# Patient Record
Sex: MALE | Race: NATIVE HAWAIIAN OR OTHER PACIFIC ISLANDER | HISPANIC OR LATINO
[De-identification: names, ages, dates, MRNs, and addresses within clinical notes are randomized per-mention and may not be internally consistent; named-entity substitution may affect disease eponyms.]

---
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
## 2024-01-18 DIAGNOSIS — M47.816 SPONDYLOSIS W/OUT MYELOPATHY OR RADICULOPATHY, LUMBAR REGION: ICD-10-CM

## 2024-01-18 PROBLEM — Z00.00 ENCOUNTER FOR PREVENTIVE HEALTH EXAMINATION: Status: ACTIVE | Noted: 2024-01-18

## 2024-01-18 PROBLEM — Z78.9 OTHER SPECIFIED HEALTH STATUS: Chronic | Status: ACTIVE | Noted: 2021-11-27

## 2024-01-19 ENCOUNTER — APPOINTMENT (OUTPATIENT)
Dept: ORTHOPEDIC SURGERY | Facility: CLINIC | Age: 24
End: 2024-01-19
Payer: COMMERCIAL

## 2024-01-19 VITALS — SYSTOLIC BLOOD PRESSURE: 107 MMHG | HEART RATE: 84 BPM | DIASTOLIC BLOOD PRESSURE: 83 MMHG

## 2024-01-19 DIAGNOSIS — M51.36 OTHER INTERVERTEBRAL DISC DEGENERATION, LUMBAR REGION: ICD-10-CM

## 2024-01-19 PROCEDURE — 99204 OFFICE O/P NEW MOD 45 MIN: CPT

## 2024-01-19 PROCEDURE — 72100 X-RAY EXAM L-S SPINE 2/3 VWS: CPT

## 2024-01-19 NOTE — HISTORY OF PRESENT ILLNESS
[de-identified] : 23-year-old male who is having intermittent low back pain over the last 5 years.  Pain occurs when he is particularly active.  He states the pain last 1 or 2 days and prevents him from changing position.  When pain occurs it rates 9 out of 10 it is a stabbing sensation and it is located in the right greater than left side of his low back.  He has had no physical therapy chiropractic or pain management injections for this issue.  He is taking Tylenol intermittently with effect.  His last acute flareup was approximately 1 week ago its already resolved.  The pain never goes down his legs is does not cause change in bladder or or bowel.  Past medical surgical social family allergy history is reviewed.

## 2024-01-19 NOTE — PHYSICAL EXAM
[de-identified] : CONSTITUTIONAL: The patient is a very pleasant individual who is well-nourished and who appears stated age. PSYCHIATRIC: The patient is alert and oriented X 3 and in no apparent distress, and participates with orthopedic evaluation well. HEAD: Atraumatic and is nonsyndromic in appearance. EENT: No visible thyromegaly, EOMI. RESPIRATORY: Respiratory rate is regular, not dyspneic on examination. LYMPHATICS: There is no inguinal lymphadenopathy INTEGUMENTARY: Skin is clean, dry, and intact about the bilateral lower extremities and lumbar spine. VASCULAR: There is brisk capillary refill about the bilateral lower extremities. NEUROLOGIC: There are no pathologic reflexes. There is no decrease in sensation of the bilateral lower extremities on manual  examination. Deep tendon reflexes are well maintained at 2+/4 of the bilateral lower extremities and are symmetric.. MUSCULOSKELETAL: There is no visible muscular atrophy. Manual motor strength is well maintained in the bilateral lower extremities. Range of motion of lumbar spine is well maintained. The patient ambulates in a non-myelopathic manner. Negative tension sign and straight leg raise bilaterally. Quad extension, ankle dorsiflexion, EHL, plantar flexion, and ankle eversion are well preserved. Normal secondary orthopaedic exam of bilateral hips, greater trochanteric area, knees and ankles mild mechanical low back pain on flexion [de-identified] : Lumbar x-rays have been reviewed on today's date 2 views were ordered and reviewed shows essentially normal lumbar MRI pedicles maintain lordosis is maintained no acute osseous abnormalities

## 2024-01-19 NOTE — DISCUSSION/SUMMARY
[Medication Risks Reviewed] : Medication risks reviewed [PRN] : PRN [de-identified] : 45 minutes was spent reviewing the x-rays as well as discussing with the patient their clinical presentation, diagnosis and providing education.  Conservative treatment was discussed with the patient at length. Anticipatory guidance regarding disease process lumbar discogenic pain, avoidance of acute exacerbation this was discussed at length and all patients commenting concerns were answered to the patient's satisfaction. Physical therapy for decrease pain and increase function was ordered. Patient was given home exercises as approved by [employer] and works well held directed toward this particular process. Intermittent use of acetaminophen 500 mg 2 tablets t.i.d. p.r.n. mild to moderate pain, ibuprofen 600 mg t.i.d. p.r.n. severe pain with food or milk if there is no medical contraindication. Home exercise including stretching on a daily basis for 20-30 minutes was recommended. Heat, ice, topical were discussed as needed. The patient will followup in 6-8 weeks at which point in time if symptoms continue we will order MRI studies to guide treatment plan including possible injection therapy with pain management versus surgical option.

## 2024-10-15 ENCOUNTER — EMERGENCY (EMERGENCY)
Facility: HOSPITAL | Age: 24
LOS: 0 days | Discharge: HOME | End: 2024-10-15
Attending: EMERGENCY MEDICINE
Payer: COMMERCIAL

## 2024-10-15 VITALS
TEMPERATURE: 99 F | SYSTOLIC BLOOD PRESSURE: 112 MMHG | WEIGHT: 169.98 LBS | RESPIRATION RATE: 18 BRPM | HEART RATE: 60 BPM | DIASTOLIC BLOOD PRESSURE: 73 MMHG | OXYGEN SATURATION: 98 %

## 2024-10-15 DIAGNOSIS — B02.9 ZOSTER WITHOUT COMPLICATIONS: ICD-10-CM

## 2024-10-15 DIAGNOSIS — R21 RASH AND OTHER NONSPECIFIC SKIN ERUPTION: ICD-10-CM

## 2024-10-15 PROCEDURE — 99284 EMERGENCY DEPT VISIT MOD MDM: CPT

## 2024-10-15 RX ORDER — VALACYCLOVIR 1000 MG/1
1 TABLET ORAL
Qty: 21 | Refills: 0
Start: 2024-10-15 | End: 2024-10-21

## 2024-10-15 RX ORDER — OXYCODONE HYDROCHLORIDE 30 MG/1
1 TABLET, FILM COATED, EXTENDED RELEASE ORAL
Qty: 6 | Refills: 0
Start: 2024-10-15 | End: 2024-10-17

## 2024-10-15 NOTE — ED STATDOCS - PROGRESS NOTE DETAILS
Using , 23-year-old male came from [place] approximately 2 years ago presents with left-sided back and chest rash approximately 1 week.  Patient noticed a rash developing in the back and spreading towards the front.  Associated pain and itchiness with the rash.  Patient tried to use hydrocortisone cream without relief.  Patient states that when he came here 2 years ago he received all his immunizations but is unsure if he had chickenpox when he was younger.  Dermatomal blister like rash to the left upper back and left upper chest, does not cross midline.  No surrounding erythema or signs of infection.  Vitals from intake are unremarkable and patient afebrile.  Likely related to shingles.  Advised to take Motrin/Tylenol for pain, will prescribed Valtrex, and will discharge home.  Informed patient that he should avoid physical contact with the other children who are in the household and to stay away from anybody who may be pregnant.  Patient is aware and agrees with plan.

## 2024-10-15 NOTE — ED STATDOCS - OBJECTIVE STATEMENT
24 y/o male presents to the ED c/o rash left upper chest x days. Denies fevers, n/v/d. No other complaints at this time.

## 2024-10-15 NOTE — ED STATDOCS - CLINICAL SUMMARY MEDICAL DECISION MAKING FREE TEXT BOX
Pt presents with chicken pox appearing rash. Pt has never had chicken pox. Plan: antivirals,  follow up.

## 2024-10-15 NOTE — ED STATDOCS - NSFOLLOWUPINSTRUCTIONS_ED_ALL_ED_FT
Culebrilla  Shingles  A rash with blisters on the skin.  La culebrilla, o herpes zóster, es tulio infección. Produce tulio erupción cutánea y ampollas. Estas zonas infectadas pueden doler mucho.    La culebrilla solo ocurre si:  Ha tenido varicela.  Le gonzalez aplicado tulio inyección llamada vacuna para protegerlo de la varicela. La culebrilla es poco frecuente en aislinn isaac.  ¿Cuáles son las causas?  La culebrilla es causada por un germen llamado virus de la varicela zóster. Aisilnn es el mismo germen que causa la varicela. Después de estar expuesto al germen, aislinn permanece en el cuerpo, marya está latente. Mableton significa que no está activo.    La culebrilla se produce si el germen se vuelve activo nuevamente. Mableton puede ocurrir años después de la primera exposición al germen.    ¿Qué incrementa el riesgo?  Es más probable que tenga culebrilla si:  Es mayor de 60 años.  Está bajo mucho estrés.  Tiene un sistema inmunitario débil. El sistema inmunitario es el sistema de defensa de campos cuerpo. Puede estar débil si la persona:  Tiene el virus de inmunodeficiencia humana (VIH).  Tiene síndrome de inmunodeficiencia adquirida (sida).  Tiene cáncer.  Tatyana medicamentos que debilitan el sistema inmunitario. Estos incluyen los medicamentos para el trasplante de órganos.  ¿Cuáles son los signos o síntomas?  Los primeros síntomas de la culebrilla pueden ser picazón, hormigueo o dolor. Es posible que sienta que le arde la piel.    Unos días o semanas más tarde, tendrá tulio erupción cutánea. Mableton es lo que puede esperar:  Es probable que la erupción aparezca en un solo lado del cuerpo.  La erupción puede tener forma de cinturón o cinta. Con el tiempo, se convertirá en ampollas llenas de líquido.  Las ampollas se romperán y se transformarán en costras.  Las costras se secarán en unas 2 o 3 semanas.  También puede tener:  Fiebre.  Escalofríos.  Dolor de isidro.  Náuseas.  ¿Cómo se diagnostica?  La culebrilla se diagnostica con un examen de la piel. Se puede melchor tulio muestra llamada cultivo de tulio de las ampollas y enviarla a un laboratorio. [place]ableton indicará si tiene culebrilla.    ¿Cómo se trata?  La erupción cutánea puede durar varias semanas. No hay gaby para la culebrilla, marya el médico puede darle medicamentos. Estos medicamentos pueden hacer lo siguiente:  Ayudar a aliviar el dolor.  Ayudar a la picazón.  Ayudar con la irritación y la hinchazón.  Lograr tulio mejoría más pronto.  Prevenir problemas a kaz plazo.  Si la erupción está en la faheem, es posible que deba consultar a un oculista o a un médico especialista en nariz, garganta y oído (otorrinolaringólogo).    Siga estas instrucciones en campos casa:  Medicamentos    Use los medicamentos solamente lillian se lo haya indicado el médico.  Use tulio crema para calmar la picazón o cremas anestésicas en la erupción cutánea o las ampollas según las indicaciones del médico.  Para aliviar la picazón y las molestias    A bathtub filled with water.  Para ayudar a la picazón:  Coloque paños fríos y húmedos, llamados compresas frías, sobre la erupción cutánea o las ampollas.  [place] un baño frío. Pruebe agregar bicarbonato de sodio o marlon seca en el agua. No se bañe con [place].  Use tulio loción de calamina en la erupción cutánea o las ampollas. Puede conseguir aislinn tipo de loción en la idris.  Cuidado de las ampollas y la erupción cutánea    Mantenga la jack de la erupción cutánea cubierta con tulio venda floja.  Use ropa holgada que no roce contra la erupción.  Cuídese la erupción cutánea lillian se lo haya indicado el médico. Asegúrese de hacer lo siguiente:  Lávese las jonathan con agua y jabón rodrigo al menos 20 segundos antes y después de cambiarse la venda. Si no dispone de agua y jabón, use un desinfectante para jonathan.  Mantenga la erupción cutánea y las ampollas limpias lavando la jack con jabón suave y agua fría.  Cámbiese la venda.  Verifique la erupción cutánea todos los días para detectar signos de infección. Esté atento a los siguientes signos:  Aumento del enrojecimiento, la hinchazón o el dolor.  Líquido o adelia.  [place].  Pus o mal olor.  No se rasque el área de la erupción. No se toque las ampollas. Para evitar rascarse:  Tenga las uñas siempre cortas y limpias.  Trate de usar guantes o manoplas mientras duerme.  Instrucciones generales    [place].  Lávese las jonathan frecuentemente con agua y jabón rodrigo al menos 20 segundos. Si no dispone de agua y jabón, use un desinfectante para jonathan. Lavarse las jonathan reduce la probabilidad de contraer tulio infección en la piel.  Campos infección puede causar varicela en otras personas. Si tiene ampollas que aún no son costras, manténgase alejado de:  Los bebés.  Las embarazadas.  Los niños con eczema.  Personas de edad avanzada que tienen un trasplante de órgano.  Las personas que tienen tulio enfermedad de larga duración, o crónica.  Las personas que no hayan tenido varicela antes.  Las personas que no hayan recibido la vacuna contra la varicela.  ¿Cómo se previene?  Las vacunas son la mejor manera de evitar que contraiga varicela o culebrilla. Hable con campos médico sobre la aplicación de estas vacunas.    Dónde obtener más información  Centers for Disease Control and Prevention (CDC) (Centros para el Control y la Prevención de Enfermedades): www.cdc.gov  Comuníquese con un médico si:  El dolor no mejora con los medicamentos.  El dolor no mejora después de que se gaby la erupción cutánea.  Tiene signos de infección alrededor de la erupción cutánea.  La erupción cutánea o las ampollas empeoran.  Tiene fiebre o escalofríos.  Solicite ayuda de inmediato si:  La erupción cutánea está en el irving o en la nariz.  Tiene dolor en el irving o cerca de los ojos.  Pierde la sensación en un lado del irving.  Tiene dificultad para andrey.  Siente dolor o zumbido en el oído.

## 2024-10-15 NOTE — ED STATDOCS - PHYSICAL EXAMINATION
Constitutional: NAD AAOx3  Eyes: EOMI, pupils equal  Head: Normocephalic atraumatic  Mouth: no airway obstruction  Cardiac: regular rate   Resp: Lungs CTAB  GI: Abd s/nt/nd  Neuro: CN2-12 intact  Skin: vesicular rash with red base left chest wall top left shoulder.

## 2024-10-15 NOTE — ED STATDOCS - PATIENT PORTAL LINK FT
You can access the FollowMyHealth Patient Portal offered by [facility] by registering at the following website: [email or web address]. By joining [employer]’s FollowMyHealth portal, you will also be able to view your health information using other applications (apps) compatible with our system.
